# Patient Record
Sex: FEMALE | Race: WHITE | Employment: FULL TIME | URBAN - METROPOLITAN AREA
[De-identification: names, ages, dates, MRNs, and addresses within clinical notes are randomized per-mention and may not be internally consistent; named-entity substitution may affect disease eponyms.]

---
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
## 2022-03-13 ENCOUNTER — OFFICE VISIT (OUTPATIENT)
Dept: URGENT CARE | Age: 22
End: 2022-03-13
Payer: MEDICAID

## 2022-03-13 VITALS
BODY MASS INDEX: 37.21 KG/M2 | OXYGEN SATURATION: 97 % | RESPIRATION RATE: 14 BRPM | HEIGHT: 63 IN | DIASTOLIC BLOOD PRESSURE: 69 MMHG | WEIGHT: 210 LBS | TEMPERATURE: 99.7 F | SYSTOLIC BLOOD PRESSURE: 115 MMHG | HEART RATE: 113 BPM

## 2022-03-13 DIAGNOSIS — J03.90 ACUTE TONSILLITIS, UNSPECIFIED ETIOLOGY: Primary | ICD-10-CM

## 2022-03-13 LAB
S PYO AG THROAT QL: NEGATIVE
VALID INTERNAL CONTROL?: YES

## 2022-03-13 PROCEDURE — 99213 OFFICE O/P EST LOW 20 MIN: CPT | Performed by: FAMILY MEDICINE

## 2022-03-13 PROCEDURE — 87880 STREP A ASSAY W/OPTIC: CPT | Performed by: FAMILY MEDICINE

## 2022-03-13 RX ORDER — PENICILLIN V POTASSIUM 500 MG/1
500 TABLET, FILM COATED ORAL 3 TIMES DAILY
Qty: 30 TABLET | Refills: 0 | Status: SHIPPED | OUTPATIENT
Start: 2022-03-13 | End: 2022-03-23

## 2022-03-13 RX ORDER — FLUOXETINE HYDROCHLORIDE 20 MG/1
CAPSULE ORAL DAILY
COMMUNITY

## 2022-03-13 NOTE — PATIENT INSTRUCTIONS
Tonsillitis: Care Instructions  Overview     Tonsillitis is an infection of the tonsils that is caused by bacteria or a virus. The tonsils are in the back of the throat and are part of the immune system. Tonsillitis typically lasts from a few days up to a couple of weeks. Tonsillitis caused by a virus goes away on its own. Tonsillitis caused by the bacteria that causes strep throat is treated with antibiotics. You and your doctor may consider surgery to remove the tonsils (tonsillectomy) if you have serious complications or repeat infections. Follow-up care is a key part of your treatment and safety. Be sure to make and go to all appointments, and call your doctor if you are having problems. It's also a good idea to know your test results and keep a list of the medicines you take. How can you care for yourself at home? Home care can help with a sore throat and other symptoms. Here are some things you can do to help yourself feel better. · If your doctor prescribed antibiotics, take them as directed. Do not stop taking them just because you feel better. You need to take the full course of antibiotics. · Gargle with warm salt water. This helps reduce swelling and relieve discomfort. Gargle once an hour with 1 teaspoon of salt mixed in 8 fluid ounces of warm water. · Take an over-the-counter pain medicine, such as acetaminophen (Tylenol), ibuprofen (Advil, Motrin), or naproxen (Aleve). Be safe with medicines. Read and follow all instructions on the label. No one younger than 20 should take aspirin. It has been linked to Reye syndrome, a serious illness. · Be careful when taking over-the-counter cold or flu medicines and Tylenol at the same time. Many of these medicines have acetaminophen, which is Tylenol. Read the labels to make sure that you are not taking more than the recommended dose. Too much acetaminophen (Tylenol) can be harmful.   · Try lozenges or an over-the-counter throat spray to relieve throat pain. · Drink plenty of fluids. Fluids may help soothe an irritated throat. Drink warm or cool liquids (whichever feels better). These include tea, soup, and juice. · Do not smoke, and avoid secondhand smoke. Smoking can make tonsillitis worse. If you need help quitting, talk to your doctor about stop-smoking programs and medicines. These can increase your chances of quitting for good. · Use a vaporizer or humidifier to add moisture to your bedroom. Follow the directions for cleaning the machine. · Get plenty of rest.  When should you call for help? Call your doctor now or seek immediate medical care if:    · Your pain gets worse on one side of your throat.     · You have a new or higher fever.     · You notice changes in your voice.     · You have trouble opening your mouth.     · You have any trouble breathing.     · You have much more trouble swallowing.     · You have a fever with a stiff neck or a severe headache.     · You are sensitive to light or feel very sleepy or confused. Watch closely for changes in your health, and be sure to contact your doctor if:    · You do not get better after 2 days. Where can you learn more? Go to http://www.gray.com/  Enter M655 in the search box to learn more about \"Tonsillitis: Care Instructions. \"  Current as of: September 8, 2021               Content Version: 13.2  © 3221-0066 [employer]. Care instructions adapted under license by [employer] (which disclaims liability or warranty for this information). If you have questions about a medical condition or this instruction, always ask your healthcare professional. [place] [address] any warranty or liability for your use of this information.

## 2022-03-13 NOTE — PROGRESS NOTES
Sore Throat   The history is provided by the patient. This is a new problem. The current episode started more than 2 days ago. The problem has been rapidly worsening. There has been no fever. Associated symptoms include congestion, headaches, swollen glands and trouble swallowing. Pertinent negatives include no plugged ear sensation. She has had no exposure to strep or mono. She has tried NSAIDs for the symptoms. The treatment provided mild relief. History reviewed. No pertinent past medical history. History reviewed. No pertinent surgical history. Family History   Problem Relation Age of Onset    Cancer Father     High Cholesterol Father         Social History     Socioeconomic History    Marital status: SINGLE     Spouse name: Not on file    Number of children: Not on file    Years of education: Not on file    Highest education level: Not on file   Occupational History    Not on file   Tobacco Use    Smoking status: Never Smoker    Smokeless tobacco: Never Used   Substance and Sexual Activity    Alcohol use: Not on file    Drug use: Not on file    Sexual activity: Not on file   Other Topics Concern    Not on file   Social History Narrative    Not on file     Social Determinants of Health     Financial Resource Strain:     Difficulty of Paying Living Expenses: Not on file   Food Insecurity:     Worried About Running Out of Food in the Last Year: Not on file    Joe of Food in the Last Year: Not on file   Transportation Needs:     Lack of Transportation (Medical): Not on file    Lack of Transportation (Non-Medical):  Not on file   Physical Activity:     Days of Exercise per Week: Not on file    Minutes of Exercise per Session: Not on file   Stress:     Feeling of Stress : Not on file   Social Connections:     Frequency of Communication with Friends and Family: Not on file    Frequency of Social Gatherings with Friends and Family: Not on file    Attends Baptism Services: Not on file    Active Member of Clubs or Organizations: Not on file    Attends Club or Organization Meetings: Not on file    Marital Status: Not on file   Intimate Partner Violence:     Fear of Current or Ex-Partner: Not on file    Emotionally Abused: Not on file    Physically Abused: Not on file    Sexually Abused: Not on file   Housing Stability:     Unable to Pay for Housing in the Last Year: Not on file    Number of [place] in the Last Year: Not on file    Unstable Housing in the Last Year: Not on file                ALLERGIES: Patient has no known allergies. Review of Systems   HENT: Positive for congestion, sore throat and trouble swallowing. Neurological: Positive for headaches. All other systems reviewed and are negative. Vitals:    03/13/22 0938   BP: 115/69   Pulse: (!) 113   Resp: 14   Temp: 99.7 °F (37.6 °C)   SpO2: 97%   Weight: 210 lb (95.3 kg)   Height: 5' 3\" (1.6 m)       Physical Exam  Vitals and nursing note reviewed. Constitutional:       General: She is not in acute distress. Appearance: She is not ill-appearing. HENT:      Mouth/Throat:      Pharynx: Posterior oropharyngeal erythema present. Tonsils: Tonsillar exudate present. 2+ on the right. 2+ on the left. Pulmonary:      Effort: Pulmonary effort is normal. No respiratory distress. Breath sounds: No wheezing. MDM    Procedures      ICD-10-CM ICD-9-CM    1. Acute tonsillitis, unspecified etiology  J03.90 463 AMB POC RAPID STREP A     Medications Ordered Today   Medications    penicillin v potassium (VEETID) 500 mg tablet     Sig: Take 1 Tablet by mouth three (3) times daily for 10 days. Dispense:  30 Tablet     Refill:  0     Results for orders placed or performed in visit on 03/13/22   AMB POC RAPID STREP A   Result Value Ref Range    VALID INTERNAL CONTROL POC Yes     Group A Strep Ag Negative Negative     The patients condition was discussed with the patient and they understand. The patient is to follow up with primary care doctor. If signs and symptoms become worse the pt is to go to the ER. The patient is to take medications as prescribed.

## 2022-04-11 ENCOUNTER — OFFICE VISIT (OUTPATIENT)
Dept: URGENT CARE | Age: 22
End: 2022-04-11
Payer: MEDICAID

## 2022-04-11 VITALS
SYSTOLIC BLOOD PRESSURE: 114 MMHG | WEIGHT: 209 LBS | TEMPERATURE: 98.9 F | HEART RATE: 87 BPM | DIASTOLIC BLOOD PRESSURE: 70 MMHG | HEIGHT: 63 IN | OXYGEN SATURATION: 100 % | RESPIRATION RATE: 15 BRPM | BODY MASS INDEX: 37.03 KG/M2

## 2022-04-11 DIAGNOSIS — H61.23 BILATERAL IMPACTED CERUMEN: ICD-10-CM

## 2022-04-11 DIAGNOSIS — J01.00 ACUTE NON-RECURRENT MAXILLARY SINUSITIS: Primary | ICD-10-CM

## 2022-04-11 PROCEDURE — 99213 OFFICE O/P EST LOW 20 MIN: CPT | Performed by: FAMILY MEDICINE

## 2022-04-11 PROCEDURE — 69209 REMOVE IMPACTED EAR WAX UNI: CPT

## 2022-04-11 RX ORDER — FLUTICASONE PROPIONATE 50 MCG
2 SPRAY, SUSPENSION (ML) NASAL DAILY
Qty: 1 EACH | Refills: 0 | Status: SHIPPED | OUTPATIENT
Start: 2022-04-11 | End: 2022-04-25

## 2022-04-11 RX ORDER — CEFDINIR 300 MG/1
300 CAPSULE ORAL 2 TIMES DAILY
Qty: 20 CAPSULE | Refills: 0 | Status: SHIPPED | OUTPATIENT
Start: 2022-04-11 | End: 2022-04-21

## 2022-04-11 NOTE — PATIENT INSTRUCTIONS
Sinusitis: Care Instructions  Your Care Instructions     Sinusitis is an infection of the lining of the sinus cavities in your head. Sinusitis often follows a cold. It causes pain and pressure in your head and face. In most cases, sinusitis gets better on its own in 1 to 2 weeks. But some mild symptoms may last for several weeks. Sometimes antibiotics are needed. Follow-up care is a key part of your treatment and safety. Be sure to make and go to all appointments, and call your doctor if you are having problems. It's also a good idea to know your test results and keep a list of the medicines you take. How can you care for yourself at home? · Take an over-the-counter pain medicine, such as acetaminophen (Tylenol), ibuprofen (Advil, Motrin), or naproxen (Aleve). Read and follow all instructions on the label. · If the doctor prescribed antibiotics, take them as directed. Do not stop taking them just because you feel better. You need to take the full course of antibiotics. · Be careful when taking over-the-counter cold or flu medicines and Tylenol at the same time. Many of these medicines have acetaminophen, which is Tylenol. Read the labels to make sure that you are not taking more than the recommended dose. Too much acetaminophen (Tylenol) can be harmful. · Breathe warm, moist air from a steamy shower, a hot bath, or a sink filled with hot water. Avoid cold, dry air. Using a humidifier in your home may help. Follow the directions for cleaning the machine. · Use saline (saltwater) nasal washes. This can help keep your nasal passages open and wash out mucus and bacteria. You can buy saline nose drops at a grocery store or drugstore. Or you can make your own at home by adding 1 teaspoon of salt and 1 teaspoon of baking soda to 2 cups of distilled water. If you make your own, fill a bulb syringe with the solution, insert the tip into your nostril, and squeeze gently. Florie Pleva your nose.   · Put a hot, wet towel or a warm gel pack on your face 3 or 4 times a day for 5 to 10 minutes each time. · Try a decongestant nasal spray like oxymetazoline (Afrin). Do not use it for more than 3 days in a row. Using it for more than 3 days can make your congestion worse. When should you call for help? Call your doctor now or seek immediate medical care if:    · You have new or worse swelling or redness in your face or around your eyes.     · You have a new or higher fever. Watch closely for changes in your health, and be sure to contact your doctor if:    · You have new or worse facial pain.     · The mucus from your nose becomes thicker (like pus) or has new blood in it.     · You are not getting better as expected. Where can you learn more? Go to http://www.gray.com/  Enter Z691 in the search box to learn more about \"Sinusitis: Care Instructions. \"  Current as of: September 8, 2021               Content Version: 13.2  © 2006-2022 [employer]. Care instructions adapted under license by [employer] (which disclaims liability or warranty for this information). If you have questions about a medical condition or this instruction, always ask your healthcare professional. [place] [address] any warranty or liability for your use of this information.

## 2022-04-11 NOTE — PROGRESS NOTES
Amna Patten is a 22 y.o. female who presents with right ear and right sinus pain and pressure this past week. Denies cough, fever, SOB, N/V/D. The history is provided by the patient. History reviewed. No pertinent past medical history. History reviewed. No pertinent surgical history. Family History   Problem Relation Age of Onset    Cancer Father     High Cholesterol Father         Social History     Socioeconomic History    Marital status: SINGLE     Spouse name: Not on file    Number of children: Not on file    Years of education: Not on file    Highest education level: Not on file   Occupational History    Not on file   Tobacco Use    Smoking status: Never Smoker    Smokeless tobacco: Never Used   Substance and Sexual Activity    Alcohol use: Not on file    Drug use: Not on file    Sexual activity: Not on file   Other Topics Concern    Not on file   Social History Narrative    Not on file     Social Determinants of Health     Financial Resource Strain:     Difficulty of Paying Living Expenses: Not on file   Food Insecurity:     Worried About Running Out of Food in the Last Year: Not on file    Joe of Food in the Last Year: Not on file   Transportation Needs:     Lack of Transportation (Medical): Not on file    Lack of Transportation (Non-Medical):  Not on file   Physical Activity:     Days of Exercise per Week: Not on file    Minutes of Exercise per Session: Not on file   Stress:     Feeling of Stress : Not on file   Social Connections:     Frequency of Communication with Friends and Family: Not on file    Frequency of Social Gatherings with Friends and Family: Not on file    Attends [employer] Services: Not on file    Active Member of Clubs or Organizations: Not on file    Attends Club or Organization Meetings: Not on file    Marital Status: Not on file   Intimate Partner Violence:     Fear of Current or Ex-Partner: Not on file    Emotionally Abused: Not on file    Physically Abused: Not on file    Sexually Abused: Not on file   Housing Stability:     Unable to Pay for Housing in the Last Year: Not on file    Number of Places Lived in the Last Year: Not on file    Unstable Housing in the Last Year: Not on file                ALLERGIES: Patient has no known allergies. Review of Systems   Constitutional: Negative for activity change, appetite change and fever. HENT: Positive for congestion, ear pain, sinus pressure, sinus pain and sore throat. Respiratory: Negative for cough and shortness of breath. Gastrointestinal: Negative for diarrhea, nausea and vomiting. Vitals:    04/11/22 1714   BP: 114/70   Pulse: 87   Resp: 15   Temp: 98.9 °F (37.2 °C)   SpO2: 100%   Weight: 209 lb (94.8 kg)   Height: 5' 3\" (1.6 m)       Physical Exam  Vitals and nursing note reviewed. Constitutional:       General: She is not in acute distress. Appearance: She is well-developed. She is not diaphoretic. HENT:      Right Ear: Tympanic membrane and ear canal normal. There is impacted cerumen (s/p irrigation). Left Ear: Tympanic membrane and ear canal normal. There is impacted cerumen (s/p irrigation). Nose:      Right Sinus: No maxillary sinus tenderness or frontal sinus tenderness. Left Sinus: No maxillary sinus tenderness or frontal sinus tenderness. Mouth/Throat:      Mouth: Mucous membranes are moist.      Pharynx: Oropharynx is clear. Posterior oropharyngeal erythema present. No oropharyngeal exudate. Pulmonary:      Effort: Pulmonary effort is normal.   Lymphadenopathy:      Cervical: Cervical adenopathy present. Neurological:      Mental Status: She is alert. Psychiatric:         Behavior: Behavior normal.         Thought Content: Thought content normal.         Judgment: Judgment normal.         [facility]       ICD-10-CM ICD-9-CM   1. Acute non-recurrent maxillary sinusitis  J01.00 461.0   2.  Bilateral impacted cerumen  H61.23 380.4 Orders Placed This Encounter    REMOVE IMPACTED EAR WAX    cefdinir (OMNICEF) 300 mg capsule     Sig: Take 1 Capsule by mouth two (2) times a day for 10 days. Dispense:  20 Capsule     Refill:  0    fluticasone propionate (FLONASE) 50 mcg/actuation nasal spray     Si Sprays by Both Nostrils route daily for 14 days. Dispense:  1 Each     Refill:  0        The patient is to follow up with PCP [facility]. If signs and symptoms become worse the pt is to go to the ER.         REMOVE IMPACTED EAR WAX    Date/Time: 2022 6:13 PM  Performed by: Tona Thompson RN  Authorized by: Cristin Mohamud MD     Procedure details:     Location:  R ear and L ear    Procedure type: irrigation    Post-procedure details: Inspection:  TM intact    Hearing quality:  Normal    Patient tolerance of procedure:   Tolerated well, no immediate complications

## 2022-04-24 ENCOUNTER — OFFICE VISIT (OUTPATIENT)
Dept: URGENT CARE | Age: 22
End: 2022-04-24
Payer: MEDICAID

## 2022-04-24 VITALS
OXYGEN SATURATION: 97 % | DIASTOLIC BLOOD PRESSURE: 77 MMHG | HEIGHT: 63 IN | SYSTOLIC BLOOD PRESSURE: 119 MMHG | RESPIRATION RATE: 16 BRPM | TEMPERATURE: 98.3 F | BODY MASS INDEX: 37.03 KG/M2 | HEART RATE: 89 BPM | WEIGHT: 209 LBS

## 2022-04-24 DIAGNOSIS — R05.8 ALLERGIC COUGH: Primary | ICD-10-CM

## 2022-04-24 DIAGNOSIS — H10.33 ACUTE CONJUNCTIVITIS OF BOTH EYES, UNSPECIFIED ACUTE CONJUNCTIVITIS TYPE: ICD-10-CM

## 2022-04-24 PROCEDURE — 99213 OFFICE O/P EST LOW 20 MIN: CPT | Performed by: FAMILY MEDICINE

## 2022-04-24 RX ORDER — BENZONATATE 200 MG/1
200 CAPSULE ORAL
Qty: 21 CAPSULE | Refills: 0 | Status: SHIPPED | OUTPATIENT
Start: 2022-04-24 | End: 2022-05-01

## 2022-04-24 RX ORDER — DIPHENHYDRAMINE HCL 25 MG
1-2 CAPSULE ORAL
Qty: 30 CAPSULE | Refills: 0 | Status: SHIPPED | OUTPATIENT
Start: 2022-04-24

## 2022-04-24 RX ORDER — POLYMYXIN B SULFATE AND TRIMETHOPRIM 1; 10000 MG/ML; [USP'U]/ML
1 SOLUTION OPHTHALMIC EVERY 4 HOURS
Qty: 5 ML | Refills: 0 | Status: SHIPPED | OUTPATIENT
Start: 2022-04-24

## 2022-04-24 RX ORDER — CETIRIZINE HCL 10 MG
10 TABLET ORAL DAILY
Qty: 30 TABLET | Refills: 0 | Status: SHIPPED | OUTPATIENT
Start: 2022-04-24

## 2022-04-24 NOTE — PATIENT INSTRUCTIONS
Saline Nasal Washes: Care Instructions  Overview     Saline nasal washes help keep the nasal passages open by washing out thick or dried mucus. This simple remedy can help relieve symptoms of allergies, sinusitis, and colds. It also can make the nose feel more comfortable by keeping the mucous membranes moist. You may notice a little burning sensation in your nose the first few times you use the solution, but this usually gets better in a few days. Follow-up care is a key part of your treatment and safety. Be sure to make and go to all appointments, and call your doctor if you are having problems. It's also a good idea to know your test results and keep a list of the medicines you take. How can you care for yourself at home? · You can buy premixed saline solution in a squeeze bottle or other sinus rinse products at a drugstore. Read and follow the instructions on the label. · You also can make your own saline solution by adding 1 teaspoon of non-iodized salt and 1 teaspoon of baking soda to 2 cups of distilled or boiled and cooled water. · If you use a homemade solution, use a squeeze bottle or neti pot to get the solution into your nose. Room temperature or slightly warmed water may be more comfortable. Make sure it isn't hot. · Stand over the sink with your head tilted forward and slightly to one side. Put only the tip of the syringe or squeeze bottle into the nostril that is farther away from the sink. (The nostril closest to the sink will drain the fluid.) Gently squirt the solution into the nostril and toward the back of your head with your mouth open. The solution should flow out the other nostril. Repeat on the other side. Some sneezing and gagging are normal at first.  · Gently blow your nose. · Clean the syringe or bottle after each use. · Repeat this 2 or 3 times a day. · Use nasal washes gently if you have nosebleeds often. When should you call for help?   Watch closely for changes in your health, and be sure to contact your doctor if:    · Your symptoms do not get better.     · You have problems doing the nasal washes. Where can you learn more? Go to http://christelle-dotty.info/  Enter B784 in the search box to learn more about \"Saline Nasal Washes: Care Instructions. \"  Current as of: September 8, 2021               Content Version: 13.2  © 5873-0332 [employer]. Care instructions adapted under license by [employer] (which disclaims liability or warranty for this information). If you have questions about a medical condition or this instruction, always ask your healthcare professional. [place] [address] any warranty or liability for your use of this information.

## 2022-04-24 NOTE — PROGRESS NOTES
Cold Symptoms  This is a recurrent problem. The current episode started more than 1 week ago. The problem occurs every few minutes. The problem has not changed since onset. The cough is productive of sputum. There has been no fever. Associated symptoms include ear congestion, headaches and rhinorrhea. Pertinent negatives include no sore throat, no shortness of breath and no wheezing. Treatments tried: was seen 4/11/22- treated with Omnicef. Risk factors: no exposure to covid. She is not a smoker. Her past medical history does not include asthma. History reviewed. No pertinent past medical history. History reviewed. No pertinent surgical history. Family History   Problem Relation Age of Onset    Cancer Father     High Cholesterol Father         Social History     Socioeconomic History    Marital status: SINGLE     Spouse name: Not on file    Number of children: Not on file    Years of education: Not on file    Highest education level: Not on file   Occupational History    Not on file   Tobacco Use    Smoking status: Never Smoker    Smokeless tobacco: Never Used   Substance and Sexual Activity    Alcohol use: Not on file    Drug use: Not on file    Sexual activity: Not on file   Other Topics Concern    Not on file   Social History Narrative    Not on file     Social Determinants of Health     Financial Resource Strain:     Difficulty of Paying Living Expenses: Not on file   Food Insecurity:     Worried About Running Out of Food in the Last Year: Not on file    Joe of Food in the Last Year: Not on file   Transportation Needs:     Lack of Transportation (Medical): Not on file    Lack of Transportation (Non-Medical):  Not on file   Physical Activity:     Days of Exercise per Week: Not on file    Minutes of Exercise per Session: Not on file   Stress:     Feeling of Stress : Not on file   Social Connections:     Frequency of Communication with Friends and Family: Not on file    Frequency of Social Gatherings with Friends and Family: Not on file    Attends Synagogue Services: Not on file    Active Member of Clubs or Organizations: Not on file    Attends Club or Organization Meetings: Not on file    Marital Status: Not on file   Intimate Partner Violence:     Fear of Current or Ex-Partner: Not on file    Emotionally Abused: Not on file    Physically Abused: Not on file    Sexually Abused: Not on file   Housing Stability:     Unable to Pay for Housing in the Last Year: Not on file    Number of [place] in the Last Year: Not on file    Unstable Housing in the Last Year: Not on file                ALLERGIES: Patient has no known allergies. Review of Systems   HENT: Positive for congestion, postnasal drip and rhinorrhea. Negative for sore throat. Respiratory: Positive for cough. Negative for shortness of breath and wheezing. Neurological: Positive for headaches. All other systems reviewed and are negative. Vitals:    04/24/22 1741 04/24/22 1744   BP:  119/77   Pulse: 89    Resp: 16    Temp: 98.3 °F (36.8 °C)    SpO2: 97%    Weight:  209 lb (94.8 kg)   Height: 5' 3\" (1.6 m)        Physical Exam  Vitals and nursing note reviewed. Constitutional:       General: She is not in acute distress. Appearance: She is not ill-appearing. HENT:      Right Ear: Tympanic membrane normal.      Left Ear: Tympanic membrane normal.      Nose: No congestion or rhinorrhea. Mouth/Throat:      Pharynx: No oropharyngeal exudate or posterior oropharyngeal erythema. Pulmonary:      Effort: Pulmonary effort is normal. No respiratory distress. Breath sounds: No wheezing, rhonchi or rales. MDM    Procedures               ICD-10-CM ICD-9-CM    1. Allergic cough  R05.8 786.2    2. Acute conjunctivitis of both eyes, unspecified acute conjunctivitis type  H10.33 372.00     allergic ?       Medications Ordered Today   Medications    cetirizine (ZyrTEC) 10 mg tablet Sig: Take 1 Tablet by mouth daily. Dispense:  30 Tablet     Refill:  0    phenylephrine-dm-acetaminophen (VICKS DayQuil) 5- mg cap     Sig: Take 1-2 Capsules by mouth four (4) times daily as needed for Cough (congestion). Dispense:  30 Capsule     Refill:  0    benzonatate (TESSALON) 200 mg capsule     Sig: Take 1 Capsule by mouth three (3) times daily as needed for Cough for up to 7 days. Dispense:  21 Capsule     Refill:  0    trimethoprim-polymyxin b (POLYTRIM) ophthalmic solution     Sig: Administer 1 Drop to right eye every four (4) hours. Dispense:  5 mL     Refill:  0     No results found for any visits on 04/24/22. The patients condition was discussed with the patient and they understand. The patient is to follow up with primary care doctor. If signs and symptoms become worse the pt is to go to the ER. The patient is to take medications as prescribed.

## 2024-02-17 ENCOUNTER — OFFICE VISIT (OUTPATIENT)
Age: 24
End: 2024-02-17

## 2024-02-17 VITALS
RESPIRATION RATE: 16 BRPM | TEMPERATURE: 99.4 F | WEIGHT: 227 LBS | OXYGEN SATURATION: 100 % | HEART RATE: 93 BPM | SYSTOLIC BLOOD PRESSURE: 114 MMHG | BODY MASS INDEX: 40.21 KG/M2 | DIASTOLIC BLOOD PRESSURE: 77 MMHG

## 2024-02-17 DIAGNOSIS — N30.01 ACUTE CYSTITIS WITH HEMATURIA: Primary | ICD-10-CM

## 2024-02-17 LAB
BILIRUBIN, URINE, POC: NEGATIVE
BLOOD URINE, POC: ABNORMAL
GLUCOSE URINE, POC: NEGATIVE
KETONES, URINE, POC: NEGATIVE
LEUKOCYTE ESTERASE, URINE, POC: ABNORMAL
NITRITE, URINE, POC: POSITIVE
PH, URINE, POC: 5.5 (ref 4.6–8)
PROTEIN,URINE, POC: NEGATIVE
SPECIFIC GRAVITY, URINE, POC: 1.02 (ref 1–1.03)
URINALYSIS CLARITY, POC: ABNORMAL
URINALYSIS COLOR, POC: ABNORMAL
UROBILINOGEN, POC: ABNORMAL

## 2024-02-17 RX ORDER — SULFAMETHOXAZOLE AND TRIMETHOPRIM 800; 160 MG/1; MG/1
1 TABLET ORAL 2 TIMES DAILY
Qty: 14 TABLET | Refills: 0 | Status: CANCELLED | OUTPATIENT
Start: 2024-02-17 | End: 2024-02-24

## 2024-02-17 RX ORDER — SULFAMETHOXAZOLE AND TRIMETHOPRIM 800; 160 MG/1; MG/1
1 TABLET ORAL 2 TIMES DAILY
Qty: 14 TABLET | Refills: 0 | Status: SHIPPED | OUTPATIENT
Start: 2024-02-17 | End: 2024-02-24

## 2024-02-17 RX ORDER — PHENAZOPYRIDINE HYDROCHLORIDE 200 MG/1
200 TABLET, FILM COATED ORAL 3 TIMES DAILY PRN
Qty: 10 TABLET | Refills: 0 | Status: SHIPPED | OUTPATIENT
Start: 2024-02-17 | End: 2024-02-20

## 2024-02-17 NOTE — PROGRESS NOTES
Chief Complaint   Patient presents with    Urinary Tract Infection     1 day burning itching and some body aches          Dysuria   This is a new problem. The current episode started yesterday. The problem occurs every urination. The quality of the pain is described as burning. The pain is mild. There has been no fever. She is Sexually active. There is No history of pyelonephritis. Associated symptoms include chills, a discharge, flank pain, frequency, hematuria, hesitancy and urgency. She has tried nothing for the symptoms. There is no history of recurrent UTIs or urinary stasis.       History reviewed. No pertinent past medical history.    History reviewed. No pertinent surgical history.    Social History     Tobacco Use    Smoking status: Never    Smokeless tobacco: Never        No Known Allergies     Review of Systems   Constitutional:  Positive for chills and fever.   Genitourinary:  Positive for dysuria, flank pain, frequency, hematuria, hesitancy and urgency.   All other systems reviewed and are negative.       /77 (Site: Left Upper Arm)   Pulse 93   Temp 99.4 °F (37.4 °C)   Resp 16   Wt 103 kg (227 lb)   SpO2 100%   BMI 40.21 kg/m²      Physical Exam  Vitals and nursing note reviewed.   Constitutional:       Appearance: Normal appearance. She is not ill-appearing.   Abdominal:      Palpations: Abdomen is soft.      Tenderness: There is abdominal tenderness in the suprapubic area. There is no right CVA tenderness or left CVA tenderness.   Neurological:      Mental Status: She is alert.          1. Acute cystitis with hematuria  -     AMB POC URINALYSIS DIP STICK AUTO W/ MICRO  -     sulfamethoxazole-trimethoprim (BACTRIM DS;SEPTRA DS) 800-160 MG per tablet; Take 1 tablet by mouth 2 times daily for 7 days, Disp-14 tablet, R-0Normal  -     phenazopyridine (PYRIDIUM) 200 MG tablet; Take 1 tablet by mouth 3 times daily as needed for Pain, Disp-10 tablet, R-0Normal       Results for orders placed or

## 2024-02-17 NOTE — PATIENT INSTRUCTIONS
Results for orders placed or performed in visit on 02/17/24   AMB POC URINALYSIS DIP STICK AUTO W/ MICRO   Result Value Ref Range    Color (UA POC) Dark Yellow     Clarity (UA POC) Cloudy     Glucose, Urine, POC Negative     Bilirubin, Urine, POC Negative     Ketones, Urine, POC Negative     Specific Gravity, Urine, POC 1.025 1.001 - 1.035    Blood (UA POC) 3+     pH, Urine, POC 5.5 4.6 - 8.0    Protein, Urine, POC Negative     Urobilinogen, POC 0.2 mg/dL     Nitrite, Urine, POC Positive (A)     Leukocyte Esterase, Urine, POC 1+ (A)

## 2024-04-06 ENCOUNTER — OFFICE VISIT (OUTPATIENT)
Age: 24
End: 2024-04-06

## 2024-04-06 VITALS
HEIGHT: 63 IN | DIASTOLIC BLOOD PRESSURE: 76 MMHG | BODY MASS INDEX: 39.34 KG/M2 | HEART RATE: 78 BPM | OXYGEN SATURATION: 98 % | RESPIRATION RATE: 16 BRPM | WEIGHT: 222 LBS | TEMPERATURE: 98.7 F | SYSTOLIC BLOOD PRESSURE: 122 MMHG

## 2024-04-06 DIAGNOSIS — K04.7 DENTAL ABSCESS: Primary | ICD-10-CM

## 2024-04-06 RX ORDER — CLINDAMYCIN HYDROCHLORIDE 300 MG/1
300 CAPSULE ORAL 3 TIMES DAILY
Qty: 30 CAPSULE | Refills: 0 | Status: SHIPPED | OUTPATIENT
Start: 2024-04-06 | End: 2024-04-16

## 2024-04-06 RX ORDER — NORGESTIMATE AND ETHINYL ESTRADIOL 7DAYSX3 28
KIT ORAL
COMMUNITY
Start: 2024-03-01

## 2024-04-06 RX ORDER — IBUPROFEN 800 MG/1
800 TABLET ORAL 2 TIMES DAILY PRN
Qty: 30 TABLET | Refills: 0 | Status: SHIPPED | OUTPATIENT
Start: 2024-04-06

## 2024-04-06 ASSESSMENT — ENCOUNTER SYMPTOMS: SINUS PRESSURE: 0

## 2024-04-06 NOTE — PROGRESS NOTES
Chief Complaint   Patient presents with    Dental Pain     Pt reports having dental pain for a week.          Dental Pain   This is a new problem. The current episode started in the past 7 days. The problem occurs constantly. The problem has been gradually worsening. The pain is at a severity of 7/10. The pain is moderate. Associated symptoms include facial pain (with swelling on rt side) and thermal sensitivity. Pertinent negatives include no difficulty swallowing, fever or sinus pressure. She has tried nothing for the symptoms.       History reviewed. No pertinent past medical history.    No past surgical history on file.    Social History     Tobacco Use    Smoking status: Never    Smokeless tobacco: Never        No Known Allergies     Review of Systems   Constitutional:  Negative for fever.   HENT:  Positive for dental problem. Negative for sinus pressure.    All other systems reviewed and are negative.       /76 (Site: Right Upper Arm, Position: Sitting, Cuff Size: Medium Adult)   Pulse 78   Temp 98.7 °F (37.1 °C) (Oral)   Resp 16   Ht 1.6 m (5' 3\")   Wt 100.7 kg (222 lb)   SpO2 98%   BMI 39.33 kg/m²      Physical Exam  Vitals and nursing note reviewed.   Constitutional:       Appearance: Normal appearance.   HENT:      Mouth/Throat:      Dentition: Normal dentition. Gum lesions present. No dental tenderness or dental caries.   Neurological:      Mental Status: She is alert.          1. Dental abscess  -     clindamycin (CLEOCIN) 300 MG capsule; Take 1 capsule by mouth 3 times daily for 10 days, Disp-30 capsule, R-0Normal  -     ibuprofen (ADVIL;MOTRIN) 800 MG tablet; Take 1 tablet by mouth 2 times daily as needed for Pain, Disp-30 tablet, R-0Normal         Follow with dentist ASAP.  Salted warm water compresses.-   The patients condition was discussed with the patient and they understand.  The patient is to follow up with primary care doctor.  If signs and symptoms become worse the pt is to go to 
Chief Complaint   Patient presents with    Dental Pain     Pt reports having dental pain for a week.        Vitals:    04/06/24 0829   BP: 122/76   Site: Right Upper Arm   Position: Sitting   Cuff Size: Medium Adult   Pulse: 78   Resp: 16   Temp: 98.7 °F (37.1 °C)   TempSrc: Oral   SpO2: 98%   Weight: 100.7 kg (222 lb)   Height: 1.6 m (5' 3\")        Med reconciliation correct and up to date with changes have been made with patient.  \"Reviewed record in preparation for visit and have obtained the necessary documentation\"    
no

## 2024-05-05 ENCOUNTER — OFFICE VISIT (OUTPATIENT)
Age: 24
End: 2024-05-05

## 2024-05-05 VITALS
WEIGHT: 219 LBS | OXYGEN SATURATION: 98 % | SYSTOLIC BLOOD PRESSURE: 112 MMHG | HEIGHT: 63 IN | DIASTOLIC BLOOD PRESSURE: 76 MMHG | BODY MASS INDEX: 38.8 KG/M2 | HEART RATE: 72 BPM | TEMPERATURE: 99.1 F

## 2024-05-05 DIAGNOSIS — K08.89 PAIN, DENTAL: Primary | ICD-10-CM

## 2024-05-05 RX ORDER — DROSPIRENONE AND ETHINYL ESTRADIOL 0.02-3(28)
1 KIT ORAL DAILY
COMMUNITY
Start: 2024-04-10

## 2024-07-27 ENCOUNTER — OFFICE VISIT (OUTPATIENT)
Age: 24
End: 2024-07-27

## 2024-07-27 VITALS
WEIGHT: 218 LBS | HEART RATE: 97 BPM | DIASTOLIC BLOOD PRESSURE: 83 MMHG | SYSTOLIC BLOOD PRESSURE: 131 MMHG | TEMPERATURE: 98.6 F | OXYGEN SATURATION: 98 % | BODY MASS INDEX: 38.62 KG/M2

## 2024-07-27 DIAGNOSIS — N30.00 ACUTE CYSTITIS WITHOUT HEMATURIA: Primary | ICD-10-CM

## 2024-07-27 DIAGNOSIS — R30.0 DYSURIA: ICD-10-CM

## 2024-07-27 LAB
BILIRUBIN, URINE, POC: NEGATIVE
BLOOD URINE, POC: NEGATIVE
GLUCOSE URINE, POC: NEGATIVE
HCG, PREGNANCY, URINE, POC: NEGATIVE
KETONES, URINE, POC: NEGATIVE
LEUKOCYTE ESTERASE, URINE, POC: ABNORMAL
NITRITE, URINE, POC: NEGATIVE
PH, URINE, POC: 6 (ref 4.6–8)
PROTEIN,URINE, POC: NEGATIVE
SPECIFIC GRAVITY, URINE, POC: 1.02 (ref 1–1.03)
URINALYSIS CLARITY, POC: ABNORMAL
URINALYSIS COLOR, POC: YELLOW
UROBILINOGEN, POC: ABNORMAL
VALID INTERNAL CONTROL, POC: NORMAL

## 2024-07-27 RX ORDER — CHLORHEXIDINE GLUCONATE ORAL RINSE 1.2 MG/ML
0.12 SOLUTION DENTAL DAILY
COMMUNITY
Start: 2024-05-09

## 2024-07-27 RX ORDER — OMEPRAZOLE 20 MG/1
20 CAPSULE, DELAYED RELEASE ORAL DAILY
COMMUNITY
Start: 2024-04-23

## 2024-07-27 RX ORDER — NITROFURANTOIN 25; 75 MG/1; MG/1
100 CAPSULE ORAL 2 TIMES DAILY
Qty: 10 CAPSULE | Refills: 0 | Status: SHIPPED | OUTPATIENT
Start: 2024-07-27 | End: 2024-08-01

## 2024-07-27 RX ORDER — NITROFURANTOIN 25; 75 MG/1; MG/1
100 CAPSULE ORAL 2 TIMES DAILY
Qty: 20 CAPSULE | Refills: 0 | Status: SHIPPED | OUTPATIENT
Start: 2024-07-27 | End: 2024-07-27 | Stop reason: CLARIF

## 2024-07-27 RX ORDER — FLUCONAZOLE 150 MG/1
150 TABLET ORAL ONCE
Qty: 1 TABLET | Refills: 0 | Status: SHIPPED | OUTPATIENT
Start: 2024-07-27 | End: 2024-07-27

## 2024-07-27 ASSESSMENT — ENCOUNTER SYMPTOMS
CHEST TIGHTNESS: 0
SORE THROAT: 0
RHINORRHEA: 0
COUGH: 0
ABDOMINAL PAIN: 0
SHORTNESS OF BREATH: 0

## 2024-07-27 NOTE — PROGRESS NOTES
Subjective:      The patient/guardian gave verbal consent to treat.        Chief Complaint   Patient presents with    Dysuria     Pain when urinating X four days   Pt thinks it maybe because of a medication she was taking.         Faye Duran is a 24 y.o. female presenting to clinic today with dysuria x4 days. Endorses some pain in her side, minimal abdominal pain, denies fevers, chills, or vomiting. Denies hematuria. Endorses urinary frequency, minimal urgency. No other complaints today.        History provided by:  Patient  History limited by: nothing.   used: No          Review of Systems   Constitutional:  Negative for chills and fever.   HENT:  Negative for congestion, rhinorrhea and sore throat.    Respiratory:  Negative for cough, chest tightness and shortness of breath.    Cardiovascular:  Negative for chest pain.   Gastrointestinal:  Negative for abdominal pain.   Genitourinary:  Positive for dysuria and frequency. Negative for hematuria and urgency.       Review of Systems - negative except as listed above    Reviewed PmHx, RxHx, FmHx, SocHx, AllgHx and updated in chart.  Family History   Problem Relation Age of Onset    Cancer Father     High Cholesterol Father      No past medical history on file.   Social History     Socioeconomic History    Marital status: Single   Tobacco Use    Smoking status: Every Day    Smokeless tobacco: Never   Substance and Sexual Activity    Alcohol use: Never          Current Outpatient Medications   Medication Sig    chlorhexidine (PERIDEX) 0.12 % solution Swish and spit 0.12 mLs daily    omeprazole (PRILOSEC) 20 MG delayed release capsule Take 1 capsule by mouth Daily    ROBERT 3-0.02 MG per tablet Take 1 tablet by mouth daily    TRI-ESTARYLLA 0.18/0.215/0.25 MG-35 MCG TABS TAKE 1 TABLET BY MOUTH 1 (ONE) TIME EACH DAY. DO NOT TAKE PLACEBO PILLS AND START NEW PACK.    ibuprofen (ADVIL;MOTRIN) 800 MG tablet Take 1 tablet by mouth 2 times daily as

## 2024-08-01 LAB — BACTERIA UR CULT: ABNORMAL

## 2025-03-24 ENCOUNTER — OFFICE VISIT (OUTPATIENT)
Age: 25
End: 2025-03-24

## 2025-03-24 VITALS
RESPIRATION RATE: 16 BRPM | OXYGEN SATURATION: 99 % | HEART RATE: 89 BPM | WEIGHT: 225.7 LBS | DIASTOLIC BLOOD PRESSURE: 69 MMHG | BODY MASS INDEX: 39.98 KG/M2 | TEMPERATURE: 98.6 F | SYSTOLIC BLOOD PRESSURE: 96 MMHG

## 2025-03-24 DIAGNOSIS — B37.31 VAGINAL YEAST INFECTION: Primary | ICD-10-CM

## 2025-03-24 RX ORDER — FLUCONAZOLE 150 MG/1
150 TABLET ORAL
Qty: 2 TABLET | Refills: 0 | Status: SHIPPED | OUTPATIENT
Start: 2025-03-24

## 2025-03-24 NOTE — PATIENT INSTRUCTIONS
Patient was seen today for evaluation of vaginal itching and discharge for the past 4 days  Symptoms are consistent with a vaginal yeast infection  Patient has a history of yeast infections and does not feel the need to test for any other potential causes at this time  Denies any chance of pregnancy or concern for STIs, denies UTI symptoms  Will treat with Diflucan, once now and once in 72 hours if still having symptoms  Please follow-up with your OB/GYN for further discussion if symptoms are persisting

## 2025-03-24 NOTE — PROGRESS NOTES
visit on 03/24/25.      Objective   Physical Exam  Vitals and nursing note reviewed.   Constitutional:       General: She is not in acute distress.     Appearance: Normal appearance. She is not ill-appearing.   HENT:      Head: Normocephalic and atraumatic.   Cardiovascular:      Rate and Rhythm: Normal rate and regular rhythm.      Pulses: Normal pulses.      Heart sounds: Normal heart sounds. No murmur heard.     No friction rub. No gallop.   Pulmonary:      Effort: Pulmonary effort is normal. No respiratory distress.      Breath sounds: Normal breath sounds. No wheezing, rhonchi or rales.   Abdominal:      General: Abdomen is flat. There is no distension.      Palpations: Abdomen is soft.      Tenderness: There is no abdominal tenderness. There is no guarding.   Skin:     General: Skin is warm and dry.   Neurological:      General: No focal deficit present.      Mental Status: She is alert and oriented to person, place, and time.               An electronic signature was used to authenticate this note.    JAZMYN Tamez NP          airway protection/mental status change

## 2025-05-01 ENCOUNTER — OFFICE VISIT (OUTPATIENT)
Age: 25
End: 2025-05-01

## 2025-05-01 VITALS
OXYGEN SATURATION: 98 % | BODY MASS INDEX: 40.03 KG/M2 | TEMPERATURE: 98.4 F | WEIGHT: 226 LBS | HEART RATE: 95 BPM | DIASTOLIC BLOOD PRESSURE: 82 MMHG | SYSTOLIC BLOOD PRESSURE: 125 MMHG | RESPIRATION RATE: 16 BRPM

## 2025-05-01 DIAGNOSIS — B37.0 THRUSH: Primary | ICD-10-CM

## 2025-05-01 RX ORDER — NYSTATIN 100000 [USP'U]/ML
500000 SUSPENSION ORAL 4 TIMES DAILY
Qty: 280 ML | Refills: 0 | Status: SHIPPED | OUTPATIENT
Start: 2025-05-01

## 2025-05-01 NOTE — PATIENT INSTRUCTIONS
Nystatin swish and spit    Swish 5 mL in your mouth as long as you can tolerate, at least a minute or so before you spit it out, 4 times a day for 10 to 14 days    It is okay if you swallow it as well

## 2025-05-01 NOTE — PROGRESS NOTES
Faye Duran (:  2000) is a 24 y.o. female,Established patient, here for evaluation of the following chief complaint(s):  Other (Oral thrush, white patches on tongue )      Assessment & Plan :  Visit Diagnoses and Associated Orders         Thrush    -  Primary    nystatin (MYCOSTATIN) 356392 UNIT/ML suspension [5751]                   Nystatin swish and spit    Swish 5 mL in your mouth as long as you can tolerate, at least a minute or so before you spit it out, 4 times a day for 10 to 14 days    It is okay if you swallow it as well       Subjective :  HPI     24 y.o. female presents with concerns of thrush.  She is prone to getting vaginal yeast infections but never gotten thrush before.  About 3 days ago she noticed that she had some yellowish-white patches on her tongue.  She brush her tongue and it went away but was back later on in the day.  It has continued in this pattern for the past 3 days.  There is 1 small area that does bother her but otherwise is not painful.  No throat pain, difficulty swallowing, fevers, chills.  She is not on any inhaled steroids.       Vitals:    25 1941   BP: 125/82   BP Site: Left Upper Arm   Patient Position: Sitting   BP Cuff Size: Medium Adult   Pulse: 95   Resp: 16   Temp: 98.4 °F (36.9 °C)   SpO2: 98%   Weight: 102.5 kg (226 lb)       No results found for this visit on 25.      Objective   Physical Exam  Constitutional:       Appearance: Normal appearance.   HENT:      Head: Normocephalic.      Nose: Nose normal.      Mouth/Throat:      Mouth: Mucous membranes are moist.      Comments: Yellowish-white coating across the tongue.  No lesions or ulcerations.  No tonsillar swelling, lip swelling.  Uvula is midline.  There is no peritonsillar or tonsillar abscess.  There is no erythema.  Moist oral mucosa.  Good dentition  Skin:     General: Skin is warm and dry.   Neurological:      Mental Status: She is alert and oriented to person, place, and time.

## 2025-05-05 ENCOUNTER — OFFICE VISIT (OUTPATIENT)
Age: 25
End: 2025-05-05

## 2025-05-05 VITALS
SYSTOLIC BLOOD PRESSURE: 116 MMHG | BODY MASS INDEX: 40.39 KG/M2 | OXYGEN SATURATION: 98 % | WEIGHT: 228 LBS | TEMPERATURE: 98.3 F | DIASTOLIC BLOOD PRESSURE: 81 MMHG | HEART RATE: 93 BPM | RESPIRATION RATE: 16 BRPM

## 2025-05-05 DIAGNOSIS — N89.8 VAGINAL ITCHING: ICD-10-CM

## 2025-05-05 DIAGNOSIS — B37.31 VAGINAL YEAST INFECTION: Primary | ICD-10-CM

## 2025-05-05 RX ORDER — FAMOTIDINE 20 MG/1
20 TABLET, FILM COATED ORAL 2 TIMES DAILY
COMMUNITY

## 2025-05-05 RX ORDER — FLUCONAZOLE 150 MG/1
150 TABLET ORAL ONCE
Qty: 1 TABLET | Refills: 0 | Status: SHIPPED | OUTPATIENT
Start: 2025-05-05 | End: 2025-05-05

## 2025-05-05 NOTE — PROGRESS NOTES
2025   Faye Duran (: 2000) is a 24 y.o. female, Established patient, here for evaluation of the following chief complaint(s):  Vaginal Discharge (Vaginal discharge and itching x2 days)     ASSESSMENT/PLAN:  Below is the assessment and plan developed based on review of pertinent history, physical exam, labs, studies, and medications.  Assessment & Plan  Vaginal yeast infection       Orders:    fluconazole (DIFLUCAN) 150 MG tablet; Take 1 tablet by mouth once for 1 dose  Exam and history consistent with a Vaginal Yeast Infection  - The urgent care care team will call you if swab results mandate an alteration in the treatment plan  - Fluconazole 150 mg once  - Please drink at least 64 ounces of fluid a day  - Please follow-up your PCP in the next 2 to 3 weeks    If symptoms persist or worsen, please contact your PCP and/or return to urgent care.  Vaginal itching       Orders:    NuSwab Vaginitis Plus (VG+) with Candida (Six Species); Future          Handout given with care instructions  2. OTC for symptom management. Increase fluid intake, ensure adequate nutritional intake.  3. Follow up with PCP as needed.  4. Go to ED with development of any acute symptoms.     Follow up:  No follow-ups on file.  Follow up immediately for any new, worsening or changes or if symptoms are not improving over the next 5-7 days.     SUBJECTIVE/OBJECTIVE:    Vaginal Discharge       Ms Duran presents with concern for whitish thick vaginal discharge and itching for the past 2 days. She states symptoms are the same as previous yeast infections which she does get frequently. She states she has been STI tested for HIV, Syphilis, and others. She states she takes a daily probiotic and has used suppositories with little effect. Otherwise, she denies any headache, fever, chills, shortness of breath, chest pain, or other systemic complaints or concerns at this time.      Vaginal Discharge (Vaginal discharge and itching x2

## 2025-05-07 LAB
A VAGINAE DNA VAG QL NAA+PROBE: NORMAL SCORE
BVAB2 DNA VAG QL NAA+PROBE: NORMAL SCORE
C ALBICANS DNA VAG QL NAA+PROBE: NEGATIVE
C GLABRATA DNA VAG QL NAA+PROBE: NEGATIVE
C KRUSEI DNA VAG QL NAA+PROBE: NEGATIVE
C LUSITANIAE DNA VAG QL NAA+PROBE: NEGATIVE
C TRACH DNA SPEC QL NAA+PROBE: NEGATIVE
CANDIDA DNA VAG QL NAA+PROBE: NEGATIVE
MEGA1 DNA VAG QL NAA+PROBE: NORMAL SCORE
N GONORRHOEA DNA VAG QL NAA+PROBE: NEGATIVE
T VAGINALIS DNA VAG QL NAA+PROBE: NEGATIVE

## 2025-05-27 ENCOUNTER — OFFICE VISIT (OUTPATIENT)
Age: 25
End: 2025-05-27

## 2025-05-27 VITALS
OXYGEN SATURATION: 98 % | SYSTOLIC BLOOD PRESSURE: 113 MMHG | HEART RATE: 86 BPM | BODY MASS INDEX: 41.12 KG/M2 | WEIGHT: 232.1 LBS | DIASTOLIC BLOOD PRESSURE: 76 MMHG | HEIGHT: 63 IN | TEMPERATURE: 98.5 F | RESPIRATION RATE: 16 BRPM

## 2025-05-27 DIAGNOSIS — B37.31 CANDIDA VAGINITIS: Primary | ICD-10-CM

## 2025-05-27 RX ORDER — FLUCONAZOLE 150 MG/1
150 TABLET ORAL
Qty: 2 TABLET | Refills: 0 | Status: SHIPPED | OUTPATIENT
Start: 2025-05-27 | End: 2025-06-02

## 2025-05-27 ASSESSMENT — ENCOUNTER SYMPTOMS
RHINORRHEA: 0
ABDOMINAL PAIN: 0
VOMITING: 0
NAUSEA: 0
SORE THROAT: 0

## 2025-05-27 NOTE — PATIENT INSTRUCTIONS
Thank you for visiting [facility] today.    We will call you with your test results once they are received.    A survey will be sent shortly to your phone/email.  Please complete this so we may know how to better serve you in the future.

## 2025-05-27 NOTE — PROGRESS NOTES
2025   Faye Duran (: 2000) is a 25 y.o. female, Established patient, here for evaluation of the following chief complaint(s):  Yeast Infection (Pt in today with a possible yeast infection. Pt states she is having white vaginal discharge it is thick, burning and itching,. Pt denies having dysuria when urinating,and bad odor. Onset sx started last night.)     ASSESSMENT/PLAN:  Below is the assessment and plan developed based on review of pertinent history, physical exam, labs, studies, and medications.       Assessment & Plan  Candida vaginitis   This patient presents with vaginal discharge, irritation, and itching and a history consistent with possible yeast infection. Differential includes simple cystitis and pyelonephritis. Based on history and physical no signs of PID at this time. Patient declines pelvic exam and agrees to full panel testing for STDs as well. No indications to test urine for infection. Will send Nuswab and empirically treat for candida vaginitis. Patient has scheduled appointment with OB/Gyn on .    Orders:    NuSwab Vaginitis Plus (VG+) with Candida (Six Species); Future    fluconazole (DIFLUCAN) 150 MG tablet; Take 1 tablet by mouth every 72 hours for 6 days      Handout given with care instructions  OTC for symptom management. Increase fluid intake, ensure adequate nutritional intake.  Follow up with PCP as needed.  Go to ED with development of any acute symptoms.     Follow up:  No follow-ups on file.  Follow up immediately for any new, worsening or changes or if symptoms are not improving over the next 5-7 days.     SUBJECTIVE:  25 year old female presents with abnormal vaginal itching, irritation, and discharge that started last night. Patient reports recurrent yeast infections about once a month and denies any associated trigger. Patient is taking long term birth control. Patient has an appointment with gyn on . Patient reports over the counter therapy has not

## 2025-05-30 LAB
A VAGINAE DNA VAG QL NAA+PROBE: ABNORMAL SCORE
BVAB2 DNA VAG QL NAA+PROBE: ABNORMAL SCORE
C ALBICANS DNA VAG QL NAA+PROBE: POSITIVE
C GLABRATA DNA VAG QL NAA+PROBE: NEGATIVE
C KRUSEI DNA VAG QL NAA+PROBE: NEGATIVE
C LUSITANIAE DNA VAG QL NAA+PROBE: NEGATIVE
C TRACH DNA SPEC QL NAA+PROBE: NEGATIVE
CANDIDA DNA VAG QL NAA+PROBE: NEGATIVE
MEGA1 DNA VAG QL NAA+PROBE: ABNORMAL SCORE
N GONORRHOEA DNA VAG QL NAA+PROBE: NEGATIVE
T VAGINALIS DNA VAG QL NAA+PROBE: NEGATIVE

## 2025-05-31 ENCOUNTER — RESULTS FOLLOW-UP (OUTPATIENT)
Age: 25
End: 2025-05-31

## 2025-06-22 ENCOUNTER — OFFICE VISIT (OUTPATIENT)
Age: 25
End: 2025-06-22

## 2025-06-22 VITALS
OXYGEN SATURATION: 98 % | RESPIRATION RATE: 16 BRPM | WEIGHT: 229 LBS | HEART RATE: 97 BPM | DIASTOLIC BLOOD PRESSURE: 77 MMHG | BODY MASS INDEX: 40.57 KG/M2 | SYSTOLIC BLOOD PRESSURE: 118 MMHG | TEMPERATURE: 98.5 F

## 2025-06-22 DIAGNOSIS — N89.8 VAGINAL ITCHING: Primary | ICD-10-CM

## 2025-06-22 RX ORDER — FLUCONAZOLE 150 MG/1
150 TABLET ORAL AS NEEDED
Qty: 2 TABLET | Refills: 0 | Status: SHIPPED | OUTPATIENT
Start: 2025-06-22

## 2025-06-26 LAB
A VAGINAE DNA VAG QL NAA+PROBE: ABNORMAL SCORE
BVAB2 DNA VAG QL NAA+PROBE: ABNORMAL SCORE
C ALBICANS DNA VAG QL NAA+PROBE: POSITIVE
C GLABRATA DNA VAG QL NAA+PROBE: NEGATIVE
C TRACH DNA SPEC QL NAA+PROBE: NEGATIVE
MEGA1 DNA VAG QL NAA+PROBE: ABNORMAL SCORE
N GONORRHOEA DNA VAG QL NAA+PROBE: NEGATIVE
T VAGINALIS DNA VAG QL NAA+PROBE: NEGATIVE

## 2025-06-27 ENCOUNTER — RESULTS FOLLOW-UP (OUTPATIENT)
Age: 25
End: 2025-06-27

## 2025-07-21 ENCOUNTER — OFFICE VISIT (OUTPATIENT)
Age: 25
End: 2025-07-21

## 2025-07-21 VITALS
SYSTOLIC BLOOD PRESSURE: 115 MMHG | HEART RATE: 100 BPM | OXYGEN SATURATION: 97 % | BODY MASS INDEX: 39.86 KG/M2 | RESPIRATION RATE: 16 BRPM | DIASTOLIC BLOOD PRESSURE: 76 MMHG | TEMPERATURE: 98.4 F | WEIGHT: 225 LBS

## 2025-07-21 DIAGNOSIS — N30.00 ACUTE CYSTITIS WITHOUT HEMATURIA: ICD-10-CM

## 2025-07-21 DIAGNOSIS — R35.0 URINARY FREQUENCY: Primary | ICD-10-CM

## 2025-07-21 LAB
BILIRUBIN, URINE, POC: NEGATIVE
BLOOD URINE, POC: NEGATIVE
GLUCOSE URINE, POC: NEGATIVE
KETONES, URINE, POC: NORMAL
LEUKOCYTE ESTERASE, URINE, POC: NEGATIVE
NITRITE, URINE, POC: NEGATIVE
PH, URINE, POC: 6 (ref 4.6–8)
PROTEIN,URINE, POC: NORMAL
SPECIFIC GRAVITY, URINE, POC: 1.03 (ref 1–1.03)
URINALYSIS CLARITY, POC: NORMAL
URINALYSIS COLOR, POC: YELLOW
UROBILINOGEN, POC: NORMAL

## 2025-07-21 RX ORDER — CIPROFLOXACIN 500 MG/1
500 TABLET, FILM COATED ORAL 2 TIMES DAILY
Qty: 20 TABLET | Refills: 0 | Status: SHIPPED | OUTPATIENT
Start: 2025-07-21 | End: 2025-07-31

## 2025-07-21 RX ORDER — FLUCONAZOLE 100 MG/1
100 TABLET ORAL DAILY
Qty: 2 TABLET | Refills: 0 | Status: SHIPPED | OUTPATIENT
Start: 2025-07-21 | End: 2025-07-23

## 2025-07-21 NOTE — PROGRESS NOTES
2025   Patient Status: Established patient  Faye Duran (: 2000) is a 25 y.o. female, New patient, here for evaluation of the following chief complaint(s):  Urinary Frequency (Patient is here for urinary frequency/X today )        Tolerating p.o. intake well.  Denies fevers, abdominal pain, severe pain, flank pain, blood in urine.      Denies any chance of pregnancy, vaginal discharge/bleeding, pelvic pain.   Assessment & Plan  Urinary frequency       Orders:    AMB POC URINALYSIS DIP STICK AUTO W/O MICRO    ciprofloxacin (CIPRO) 500 MG tablet; Take 1 tablet by mouth 2 times daily for 10 days    fluconazole (DIFLUCAN) 100 MG tablet; Take 1 tablet by mouth daily for 2 days      Handout given with care instructions  OTC for symptom management. Increase fluid intake, ensure adequate nutritional intake.  Follow up with PCP as needed.  Go to ED with development of any acute symptoms.     SUBJECTIVE/OBJECTIVE:    Urinary Frequency   Associated symptoms include frequency.        Physical Exam  Constitutional:       Appearance: Normal appearance. She is obese.   HENT:      Head: Normocephalic and atraumatic.      Nose: Nose normal.      Mouth/Throat:      Mouth: Mucous membranes are dry.      Pharynx: Oropharynx is clear.   Eyes:      Extraocular Movements: Extraocular movements intact.      Conjunctiva/sclera: Conjunctivae normal.      Pupils: Pupils are equal, round, and reactive to light.   Cardiovascular:      Rate and Rhythm: Normal rate and regular rhythm.      Pulses: Normal pulses.      Heart sounds: Normal heart sounds.   Pulmonary:      Effort: Pulmonary effort is normal.      Breath sounds: Normal breath sounds.   Abdominal:      General: Abdomen is flat. Bowel sounds are normal.      Palpations: Abdomen is soft.   Skin:     General: Skin is warm and dry.   Neurological:      General: No focal deficit present.      Mental Status: She is alert and oriented to person, place, and time. Mental

## 2025-07-21 NOTE — PATIENT INSTRUCTIONS
Thank you for visiting [facility] today.    -Increase fluid intake.  Some people say cranberry juice helps with discomfort.  -Don't hold your urine.  Urinate when you feel like you need to.  -Wipe from front to back after bowel movements. Consider taking a shower after bowel movements.  -If sexually active, urinate after intercourse and use enough lubrication.   -Avoid taking bubble baths.  -Wear loose fitting clothing.  -Decrease caffeine or carbonated drinks  -Repeat urine screen in two weeks  -Take antibiotic with food and consider probiotic    A survey will be sent shortly to your phone/email.  Please complete this so we may know how to better serve you in the future.      Please go to the Emergency Department immediately for symptoms of a urinary tract infection along with any of the following:  fever with severe and sudden shaking (rigors), nausea, vomiting and the inability to keep down clear fluids or medications.